# Patient Record
Sex: FEMALE | Race: WHITE | Employment: FULL TIME | URBAN - METROPOLITAN AREA
[De-identification: names, ages, dates, MRNs, and addresses within clinical notes are randomized per-mention and may not be internally consistent; named-entity substitution may affect disease eponyms.]

---

## 2017-06-15 ENCOUNTER — OFFICE VISIT (OUTPATIENT)
Dept: FAMILY MEDICINE CLINIC | Facility: CLINIC | Age: 52
End: 2017-06-15

## 2017-06-15 ENCOUNTER — TELEPHONE (OUTPATIENT)
Dept: FAMILY MEDICINE CLINIC | Facility: CLINIC | Age: 52
End: 2017-06-15

## 2017-06-15 VITALS
HEIGHT: 67.5 IN | SYSTOLIC BLOOD PRESSURE: 122 MMHG | HEART RATE: 78 BPM | TEMPERATURE: 99 F | BODY MASS INDEX: 26.29 KG/M2 | WEIGHT: 169.5 LBS | RESPIRATION RATE: 16 BRPM | DIASTOLIC BLOOD PRESSURE: 82 MMHG

## 2017-06-15 DIAGNOSIS — K21.00 GASTROESOPHAGEAL REFLUX DISEASE WITH ESOPHAGITIS: ICD-10-CM

## 2017-06-15 DIAGNOSIS — Z00.00 HEALTH CARE MAINTENANCE: Primary | ICD-10-CM

## 2017-06-15 DIAGNOSIS — R07.89 ATYPICAL CHEST PAIN: ICD-10-CM

## 2017-06-15 PROBLEM — I05.9 MITRAL VALVE DISORDER: Status: ACTIVE | Noted: 2017-06-15

## 2017-06-15 PROCEDURE — 93000 ELECTROCARDIOGRAM COMPLETE: CPT | Performed by: FAMILY MEDICINE

## 2017-06-15 PROCEDURE — 99396 PREV VISIT EST AGE 40-64: CPT | Performed by: FAMILY MEDICINE

## 2017-06-15 PROCEDURE — 99213 OFFICE O/P EST LOW 20 MIN: CPT | Performed by: FAMILY MEDICINE

## 2017-06-15 RX ORDER — BUDESONIDE AND FORMOTEROL FUMARATE DIHYDRATE 80; 4.5 UG/1; UG/1
2 AEROSOL RESPIRATORY (INHALATION) EVERY MORNING
COMMUNITY
Start: 2013-03-14

## 2017-06-15 RX ORDER — LORATADINE AND PSEUDOEPHEDRINE 10; 240 MG/1; MG/1
1 TABLET, EXTENDED RELEASE ORAL DAILY
Qty: 30 TABLET | Refills: 3 | Status: SHIPPED | OUTPATIENT
Start: 2017-06-15 | End: 2017-06-27

## 2017-06-15 RX ORDER — VENLAFAXINE HYDROCHLORIDE 75 MG/1
1 CAPSULE, EXTENDED RELEASE ORAL DAILY
COMMUNITY
Start: 2013-07-05 | End: 2017-06-23

## 2017-06-15 RX ORDER — LORATADINE 10 MG/1
1 CAPSULE, LIQUID FILLED ORAL DAILY PRN
COMMUNITY
End: 2017-06-27

## 2017-06-15 RX ORDER — ESOMEPRAZOLE MAGNESIUM 40 MG/1
40 CAPSULE, DELAYED RELEASE ORAL
Qty: 30 CAPSULE | Refills: 2 | Status: SHIPPED | OUTPATIENT
Start: 2017-06-15 | End: 2017-09-05

## 2017-06-15 RX ORDER — ALBUTEROL SULFATE 90 UG/1
1 AEROSOL, METERED RESPIRATORY (INHALATION) EVERY 6 HOURS PRN
COMMUNITY
End: 2018-05-14

## 2017-06-15 NOTE — TELEPHONE ENCOUNTER
Lab orders needed for upcoming visit    Future Appointments  Date Time Provider Olinda Andrew   6/23/2017 10:15 AM REF [place] [facility]

## 2017-06-16 NOTE — PROGRESS NOTES
[place] [facility] [place]  PROGRESS NOTE  Chief Complaint:   Patient presents with:  Physical: No pap- sees Dr. Daxa Gonzalez      HPI:   This is a 49year old female coming in for yearly physical.  Over the past 6 months patient's been noticing a significan 0.0 oz/week       0 Standard drinks or equivalent per week    Family History:  Family History   Problem Relation Age of Onset   • Diabetes Father    • Heart Disorder Father    • Hypertension Father    • Lipids Father    • Other[other] [OTHER] Mother    • H aches, back pain, joint pain, swelling or stiffness. NEUROLOGICAL:  Denies headache, seizures, dizziness, syncope, paralysis, , numbness or tingling in the extremities,change in bowel or bladder control. HEMATOLOGIC:  Denies anemia, bleeding or bruising. intact, normal reflexes.     ASSESSMENT AND PLAN:   Geovanna Lucas was seen today for physical.    Diagnoses and all orders for this visit:    Health care maintenance    Atypical chest pain  -     EKG with interpretation and Report -IN OFFICE [[id number]]    Olaf Mckenzie

## 2017-06-22 ENCOUNTER — TELEPHONE (OUTPATIENT)
Dept: FAMILY MEDICINE CLINIC | Facility: CLINIC | Age: 52
End: 2017-06-22

## 2017-06-22 NOTE — TELEPHONE ENCOUNTER
Dr. Yuridia Pakrs, is it OK to print patient's lab orders for her to have done at the hospital?  Nabil Chaparro, 06/22/2017, 4:29 PM

## 2017-06-22 NOTE — TELEPHONE ENCOUNTER
Patient informed of the below. A copy of lab orders left at the  for patient to .  Kate Montenegro, 06/22/2017, 5:29 PM

## 2017-06-23 RX ORDER — VENLAFAXINE HYDROCHLORIDE 75 MG/1
75 CAPSULE, EXTENDED RELEASE ORAL DAILY
Qty: 90 CAPSULE | Refills: 3 | Status: SHIPPED | OUTPATIENT
Start: 2017-06-23 | End: 2018-04-21

## 2017-06-23 NOTE — TELEPHONE ENCOUNTER
Fax received from [facility] requesting a RF of patient's Venlafaxine. Please advise.  Pauline Watson, 06/23/2017, 1:38 PM    Last Labs:  Having ordered labs at the hospital   Last OV:  6/15/2017

## 2017-06-27 ENCOUNTER — OFFICE VISIT (OUTPATIENT)
Dept: FAMILY MEDICINE CLINIC | Facility: CLINIC | Age: 52
End: 2017-06-27

## 2017-06-27 ENCOUNTER — LAB ENCOUNTER (OUTPATIENT)
Dept: LAB | Age: 52
End: 2017-06-27
Attending: NURSE PRACTITIONER
Payer: COMMERCIAL

## 2017-06-27 VITALS
SYSTOLIC BLOOD PRESSURE: 114 MMHG | WEIGHT: 170.63 LBS | TEMPERATURE: 99 F | HEART RATE: 74 BPM | DIASTOLIC BLOOD PRESSURE: 80 MMHG | RESPIRATION RATE: 20 BRPM | BODY MASS INDEX: 26 KG/M2

## 2017-06-27 DIAGNOSIS — N94.89 ADNEXAL MASS: ICD-10-CM

## 2017-06-27 DIAGNOSIS — R10.2 SUPRAPUBIC PAIN: ICD-10-CM

## 2017-06-27 DIAGNOSIS — R10.32 LEFT LOWER QUADRANT PAIN: ICD-10-CM

## 2017-06-27 DIAGNOSIS — R10.32 LEFT LOWER QUADRANT PAIN: Primary | ICD-10-CM

## 2017-06-27 DIAGNOSIS — D25.9 UTERINE LEIOMYOMA, UNSPECIFIED LOCATION: ICD-10-CM

## 2017-06-27 PROCEDURE — 36415 COLL VENOUS BLD VENIPUNCTURE: CPT | Performed by: NURSE PRACTITIONER

## 2017-06-27 PROCEDURE — 99214 OFFICE O/P EST MOD 30 MIN: CPT | Performed by: NURSE PRACTITIONER

## 2017-06-27 PROCEDURE — 80048 BASIC METABOLIC PNL TOTAL CA: CPT | Performed by: NURSE PRACTITIONER

## 2017-06-27 PROCEDURE — 85025 COMPLETE CBC W/AUTO DIFF WBC: CPT | Performed by: NURSE PRACTITIONER

## 2017-06-27 RX ORDER — LORATADINE AND PSEUDOEPHEDRINE 10; 240 MG/1; MG/1
1 TABLET, EXTENDED RELEASE ORAL
Qty: 30 TABLET | Refills: 3 | COMMUNITY
Start: 2017-06-27 | End: 2018-08-24

## 2017-06-27 RX ORDER — FEXOFENADINE HCL 180 MG/1
180 TABLET ORAL DAILY
COMMUNITY
Start: 2015-08-03 | End: 2017-06-27

## 2017-06-27 NOTE — PROGRESS NOTES
[address] [place] [address]  PROGRESS NOTE  Jamila Oleary is a 49year old female.     Chief Complaint:  Patient presents with:  Abdominal Pain: sharp pain on left side     HPI:   Patient presents to office visit with complaint of LLQ abd pain that ra Heart Disorder Father    • Hypertension Father    • Lipids Father    • Other[other] [OTHER] Mother    • Heart Disorder Maternal Grandmother    • Heart Disorder Maternal Grandfather      Allergies:    Iodine (Topical)        Itching  Levofloxacin nonedematous. NECK: supple, no cervical lymphadenopathy  LUNGS: clear to auscultation, no wheezing, rhonchi, crackles. Breathing is nonlabored.   CARDIO: RRR, S1 and S2 heard, without murmur  GI: good BS's,no masses, no HSM, mild suprapubic tenderness, lo worsens. Blood work today: BMP and CBC with differential.  Urinalysis negative. Patient to follow-up with [facility] as they have been managing fibroids in past.  Patient to be seen early this week. ER precautions given. Stay hydrated.

## 2017-06-29 ENCOUNTER — TELEPHONE (OUTPATIENT)
Dept: FAMILY MEDICINE CLINIC | Facility: CLINIC | Age: 52
End: 2017-06-29

## 2017-06-29 NOTE — TELEPHONE ENCOUNTER
----- Message from ISABELA Mahoeny sent at 6/29/2017  8:17 AM CDT -----  BMP within normal limits  CBC within normal limits  Please call patient and see how OB/GYN appointment went and assess how she is feeling.   She has appt with [place] [facility] yes

## 2017-06-29 NOTE — TELEPHONE ENCOUNTER
Let pt know the following below. Pt verbalized her understanding and had no other questions at this time.        Patient states that she saw Dr Jose Betancur yesterday and he did a US and diagnosed her with Adnomyicin- inflamed uterus that is bleeding from the out

## 2017-08-11 ENCOUNTER — TELEPHONE (OUTPATIENT)
Dept: FAMILY MEDICINE CLINIC | Facility: CLINIC | Age: 52
End: 2017-08-11

## 2017-08-11 NOTE — TELEPHONE ENCOUNTER
Lab orders for CMP and CBC were put in on 6/15- additional orders for those tests were put in on 6/27 and completed by pt. Okay to cancel the pending 6/15 order?     Pinky Cyr, 08/11/17, 2:16 PM

## 2017-08-18 ENCOUNTER — TELEPHONE (OUTPATIENT)
Dept: FAMILY MEDICINE CLINIC | Facility: CLINIC | Age: 52
End: 2017-08-18

## 2017-09-05 RX ORDER — ESOMEPRAZOLE MAGNESIUM 40 MG/1
CAPSULE, DELAYED RELEASE ORAL
Qty: 90 CAPSULE | Refills: 1 | Status: SHIPPED | OUTPATIENT
Start: 2017-09-05 | End: 2017-12-28

## 2017-09-05 NOTE — TELEPHONE ENCOUNTER
Last Labs:  6/27/2017  Last OV:  6/15/2017  Last RF:  6/15/2017  No future appointments.   Vickie Adam, 09/05/17, 9:45 AM

## 2017-09-08 ENCOUNTER — TELEPHONE (OUTPATIENT)
Dept: FAMILY MEDICINE CLINIC | Facility: CLINIC | Age: 52
End: 2017-09-08

## 2017-09-08 RX ORDER — AZELASTINE HYDROCHLORIDE 0.5 MG/ML
1 SOLUTION/ DROPS OPHTHALMIC 2 TIMES DAILY
Qty: 1 BOTTLE | Refills: 6 | Status: SHIPPED | OUTPATIENT
Start: 2017-09-08

## 2017-09-08 NOTE — TELEPHONE ENCOUNTER
Medication not on current medication list but is on medication list in [facility]. Optivar 0.05% 1 gtt bilateral bid. Please advise.  Nabil Chaparro, 09/08/17, 9:38 AM    Future appt:  None  Last Appointment:  6/15/2017; No f/u recommendations noted   No

## 2017-09-15 ENCOUNTER — TELEPHONE (OUTPATIENT)
Dept: FAMILY MEDICINE CLINIC | Facility: CLINIC | Age: 52
End: 2017-09-15

## 2017-09-15 LAB
ALBUMIN/GLOBULIN RATIO: 1.8
ALBUMIN: 4.2 G/DL
ALKALINE PHOSPHATASE: 104
ALT: 23
AST: 21
BASOPHIL %: 0.8
BILIRUBIN, TOTAL: 0.4 MG/DL
BUN: 12
CALCIUM: 9.6
CARBON DIOXIDE: 28
CHLORIDE: 104
CHOL/HDLC RATIO: 2.9
CREATININE: 0.82 MG/DL
EGFR IF AFRICN AM: 97
EGFR IF NONAFRICN AM: 83
EOSINOPHIL %: 1.9
GLOBULIN: 2.4
GLUCOSE BLOOD: 87
HCT: 39.5
HDL CHOLESTEROL: 57 MG/DL (ref 60–60)
HGB: 13.2
LDL CHOLESTEROL: 91 MG/DL (ref ?–130)
LYMPHOCYTE %: 23.6
MEAN CELL VOLUME: 90.6
MEAN CORPUSCULAR HEMOGLOBIN: 30.3
MEAN CORPUSCULAR HGB CONC: 33.4
MEAN PLATELET VOLUME: 10.2
MONOCYTE %: 8
NEUTROPHIL %: 65.7
NON HDL CHOL. (LDL+VLDL): 110 MG/DL (ref ?–140)
PLT: 283
POTASSIUM: 4.6
PROTEIN, TOTAL: 6.6
RED BLOOD COUNT: 4.36
RED CELL DISTRIBUTION WIDTH: 11.9
SODIUM: 140
TOTAL CHOLESTEROL: 167 MG/DL (ref ?–200)
TRG: 94 MG/DL (ref ?–150)
TSH: 0.54 UIU/ML
WBC: 4.8

## 2017-09-15 NOTE — TELEPHONE ENCOUNTER
Left message for pt to call back. Have external lab results saying that labs look good, keep up with exercise and balanced diet.  Todd Tay, 09/15/17, 10:39 AM

## 2017-11-27 ENCOUNTER — HOSPITAL ENCOUNTER (OUTPATIENT)
Dept: GENERAL RADIOLOGY | Age: 52
Discharge: HOME | End: 2017-11-27
Attending: NURSE PRACTITIONER
Payer: COMMERCIAL

## 2017-11-27 ENCOUNTER — OFFICE VISIT (OUTPATIENT)
Dept: FAMILY MEDICINE CLINIC | Facility: CLINIC | Age: 52
End: 2017-11-27

## 2017-11-27 VITALS
WEIGHT: 170.81 LBS | SYSTOLIC BLOOD PRESSURE: 110 MMHG | TEMPERATURE: 98 F | BODY MASS INDEX: 26 KG/M2 | OXYGEN SATURATION: 98 % | DIASTOLIC BLOOD PRESSURE: 82 MMHG | HEART RATE: 78 BPM

## 2017-11-27 DIAGNOSIS — R07.89 CHEST PAIN, ATYPICAL: ICD-10-CM

## 2017-11-27 DIAGNOSIS — R06.02 SHORTNESS OF BREATH: ICD-10-CM

## 2017-11-27 DIAGNOSIS — R00.0 EXERCISE-INDUCED TACHYCARDIA: ICD-10-CM

## 2017-11-27 DIAGNOSIS — R07.89 CHEST PAIN, ATYPICAL: Primary | ICD-10-CM

## 2017-11-27 PROCEDURE — 99214 OFFICE O/P EST MOD 30 MIN: CPT | Performed by: NURSE PRACTITIONER

## 2017-11-27 PROCEDURE — 71020 XR CHEST PA + LAT CHEST (CPT=71020): CPT | Performed by: NURSE PRACTITIONER

## 2017-11-27 PROCEDURE — 90686 IIV4 VACC NO PRSV 0.5 ML IM: CPT | Performed by: NURSE PRACTITIONER

## 2017-11-27 PROCEDURE — 90471 IMMUNIZATION ADMIN: CPT | Performed by: NURSE PRACTITIONER

## 2017-11-27 NOTE — PATIENT INSTRUCTIONS
Flu shot today      For your stomach avoid caffeine, alcohol, cigarettes, spicy/acidic foods, and peppermint. Also eat small meals, do not eat before bedtime, also avoid ibuprofen/Aleve/aspirin.     Follow up for a stress test at [address] [place] [address]

## 2017-11-27 NOTE — PROGRESS NOTES
Jamila Oleary is a 47year old female. Patient presents with:  Chest Pain  Eye Problem  Medication Follow-Up      HPI:   Complaints of redness and irritation to left eye lid - upper - now a little  To right eye   Has environmental allergies.    Not use (EFFEXOR XR) 75 MG Oral Capsule SR 24 Hr Take 1 capsule (75 mg total) by mouth daily. Disp: 90 capsule Rfl: 3   Budesonide-Formoterol Fumarate (SYMBICORT) 80-4.5 MCG/ACT Inhalation Aerosol Inhale 2 puffs into the lungs every morning.  Disp:  Rfl:    Albuter auscultation  CARDIO: RRR without murmur, no edema  GI: No epigastric tenderness normal bowel sounds, no masses, no hepatosplenomegaly, or tenderness  MUSCULOSKELETAL: No tenderness to chest wall with palpation.   Normal strength and tone  PSYCH: alert and

## 2017-12-14 ENCOUNTER — TELEPHONE (OUTPATIENT)
Dept: FAMILY MEDICINE CLINIC | Facility: CLINIC | Age: 52
End: 2017-12-14

## 2017-12-14 NOTE — TELEPHONE ENCOUNTER
Patient is calling stating that she just received authorization from the insurance company that she could have the stress test done. Patient is wondering if she could have the test done at [facility] instead of [facility].  Informed patient yes but

## 2017-12-21 ENCOUNTER — TELEPHONE (OUTPATIENT)
Dept: FAMILY MEDICINE CLINIC | Facility: CLINIC | Age: 52
End: 2017-12-21

## 2017-12-21 NOTE — TELEPHONE ENCOUNTER
Mailbox is full unable to leave message.  Will try again later  David Muller, [facility] 12/21/17 2:30 PM

## 2017-12-23 NOTE — TELEPHONE ENCOUNTER
A man called back to the front office and informed them that I had the wrong number and there is no Princess there. As this is the only number in patient's chart, will mail letter.

## 2017-12-28 ENCOUNTER — OFFICE VISIT (OUTPATIENT)
Dept: FAMILY MEDICINE CLINIC | Facility: CLINIC | Age: 52
End: 2017-12-28

## 2017-12-28 VITALS
HEART RATE: 81 BPM | TEMPERATURE: 98 F | DIASTOLIC BLOOD PRESSURE: 80 MMHG | SYSTOLIC BLOOD PRESSURE: 120 MMHG | WEIGHT: 170 LBS | BODY MASS INDEX: 26 KG/M2 | OXYGEN SATURATION: 99 %

## 2017-12-28 DIAGNOSIS — J00 ACUTE NASOPHARYNGITIS: ICD-10-CM

## 2017-12-28 DIAGNOSIS — J02.9 SORE THROAT: Primary | ICD-10-CM

## 2017-12-28 PROCEDURE — 87880 STREP A ASSAY W/OPTIC: CPT | Performed by: NURSE PRACTITIONER

## 2017-12-28 PROCEDURE — 87081 CULTURE SCREEN ONLY: CPT | Performed by: NURSE PRACTITIONER

## 2017-12-28 PROCEDURE — 99213 OFFICE O/P EST LOW 20 MIN: CPT | Performed by: NURSE PRACTITIONER

## 2017-12-28 RX ORDER — INHALER, ASSIST DEVICES
SPACER (EA) MISCELLANEOUS
Qty: 1 EACH | Refills: 0 | Status: SHIPPED | OUTPATIENT
Start: 2017-12-28

## 2017-12-28 RX ORDER — ERGOCALCIFEROL 1.25 MG/1
CAPSULE ORAL
COMMUNITY
End: 2018-05-14 | Stop reason: ALTCHOICE

## 2017-12-28 NOTE — PATIENT INSTRUCTIONS
Rest, increase fluids, salt water gargles ,juan pot (use distilled water) or saline nasal spray, Claritin-D, Tylenol/Ibuprofen, follow up if symptoms persist or increase.

## 2017-12-28 NOTE — PROGRESS NOTES
CHIEF COMPLAINT:   Patient presents with:  Pharyngitis  Nasal Congestion  Runny Nose      HPI:   Page Alejandre is a 47year old female who presents to clinic today with complaints of feeling ill on Tuesday- sore throat- sinus congestion- juan pot - complaints    EXAM:   /80 (BP Location: Right arm, Patient Position: Sitting, Cuff Size: adult)   Pulse 81   Temp 97.7 °F (36.5 °C) (Tympanic)   Wt 170 lb   SpO2 99%   BMI 26.23 kg/m²   GENERAL: well developed, well nourished,in no apparent distress

## 2017-12-30 ENCOUNTER — TELEPHONE (OUTPATIENT)
Dept: FAMILY MEDICINE CLINIC | Facility: CLINIC | Age: 52
End: 2017-12-30

## 2017-12-30 NOTE — TELEPHONE ENCOUNTER
Phoned patient, patient's mailbox is full. Unable to leave a message for patient. Will try back later. Thank you.  VANNESSA MURCIA, 12/30/17, 11:32 AM

## 2017-12-30 NOTE — TELEPHONE ENCOUNTER
----- Message from ISABELA Medina sent at 12/30/2017 10:50 AM CST -----  Negative strep culture- please notify patient

## 2017-12-30 NOTE — TELEPHONE ENCOUNTER
Informed patient of below results. Patient verbalizes understanding and expressed appreciation. Patient states she isn't feeling any better and her symptoms are changing. Offered patient an appointment to be seen on Tuesday.  Patient declined appointment at

## 2018-01-02 ENCOUNTER — TELEPHONE (OUTPATIENT)
Dept: FAMILY MEDICINE CLINIC | Facility: CLINIC | Age: 53
End: 2018-01-02

## 2018-04-21 ENCOUNTER — TELEPHONE (OUTPATIENT)
Dept: FAMILY MEDICINE CLINIC | Facility: CLINIC | Age: 53
End: 2018-04-21

## 2018-04-21 RX ORDER — VENLAFAXINE HYDROCHLORIDE 75 MG/1
75 CAPSULE, EXTENDED RELEASE ORAL DAILY
Qty: 7 CAPSULE | Refills: 0 | Status: SHIPPED | OUTPATIENT
Start: 2018-04-21 | End: 2018-05-14

## 2018-04-21 NOTE — TELEPHONE ENCOUNTER
Requests refill of venlafaxine [facility]  Mail order is arriving on Thursday. She has one tablet left. Would like a weeks worth of medication sent to pharmacy? Last physical and office visit with Dr. Star Chery was 6/15/17. Last lab 9/15/17.

## 2018-04-21 NOTE — TELEPHONE ENCOUNTER
Pt needs refill for Venlafaxine. States that she does not have any more refills and has run out of medication.

## 2018-05-14 PROBLEM — F32.A DEPRESSION: Status: ACTIVE | Noted: 2018-05-14

## 2018-05-14 NOTE — PROGRESS NOTES
[place] [facility] [place]  PROGRESS NOTE  Chief Complaint:   Patient presents with:  Medication Follow-Up: Effexor- doing well      HPI:   This is a 47year old female coming in for refill of her Effexor.   Patient feels that she did very well since History:  Family History   Problem Relation Age of Onset   • Diabetes Father    • Heart Disorder Father    • Hypertension Father    • Lipids Father    • Other[other] [OTHER] Mother    • Heart Disorder Maternal Grandmother    • Heart Disorder Maternal [place] exertion or at rest.  RESPIRATORY:  Denies shortness of breath, wheezing, cough or sputum. GASTROINTESTINAL:  Denies abdominal pain, nausea, vomiting, constipation, diarrhea, or blood in stool.   MUSCULOSKELETAL:  Denies weakness, muscle aches, back pain, cyanosis, no clubbing, FROM, 2+ dorsalis pedis pulses bilaterally. NEURO:  No deficit, normal gait, strength and tone,     ASSESSMENT AND PLAN:   Perfecto Duncan was seen today for medication follow-up.     Diagnoses and all orders for this visit:    Mood swings

## 2018-06-12 RX ORDER — VENLAFAXINE HYDROCHLORIDE 75 MG/1
75 CAPSULE, EXTENDED RELEASE ORAL DAILY
Qty: 90 CAPSULE | Refills: 3 | Status: SHIPPED
Start: 2018-06-12 | End: 2019-03-28

## 2018-06-12 NOTE — TELEPHONE ENCOUNTER
Last RF:  5/14/2018 to [facility], [facility] but patient is requesting mail order. Please advise.

## 2018-06-12 NOTE — TELEPHONE ENCOUNTER
From: Page Alejandre  Sent: 6/12/2018 2:55 PM CDT  Subject: Medication Renewal Request    Page Alejandre would like a refill of the following medications:     Venlafaxine HCl ER (EFFEXOR XR) 75 MG Oral Capsule SR 24 Hr [XAVIER CARRENO MD]   Patient C

## 2018-08-24 RX ORDER — LORATADINE 10 MG
TABLET ORAL
Qty: 30 TABLET | Refills: 3 | Status: SHIPPED | OUTPATIENT
Start: 2018-08-24

## 2018-08-24 NOTE — TELEPHONE ENCOUNTER
Please advise refill of Wal-itin D. Last Rx: 6/27/17     Future appt:    Last Appointment:  5/14/2018 per last office visit notes pt it to f/u in 1 year.     TOTAL CHOLESTEROL (mg/dL)   Date Value   08/02/2017 167   ----------  HDL CHOLESTEROL (mg/dL)   Da

## 2018-08-29 ENCOUNTER — MED REC SCAN ONLY (OUTPATIENT)
Dept: FAMILY MEDICINE CLINIC | Facility: CLINIC | Age: 53
End: 2018-08-29

## 2018-10-15 ENCOUNTER — OFFICE VISIT (OUTPATIENT)
Dept: FAMILY MEDICINE CLINIC | Facility: CLINIC | Age: 53
End: 2018-10-15
Payer: COMMERCIAL

## 2018-10-15 ENCOUNTER — TELEPHONE (OUTPATIENT)
Dept: FAMILY MEDICINE CLINIC | Facility: CLINIC | Age: 53
End: 2018-10-15

## 2018-10-15 VITALS
HEART RATE: 64 BPM | TEMPERATURE: 98 F | HEIGHT: 67 IN | WEIGHT: 161.13 LBS | BODY MASS INDEX: 25.29 KG/M2 | RESPIRATION RATE: 20 BRPM | DIASTOLIC BLOOD PRESSURE: 70 MMHG | SYSTOLIC BLOOD PRESSURE: 106 MMHG

## 2018-10-15 DIAGNOSIS — E55.9 VITAMIN D DEFICIENCY: ICD-10-CM

## 2018-10-15 DIAGNOSIS — R23.2 HOT FLASHES: ICD-10-CM

## 2018-10-15 DIAGNOSIS — Z01.84 IMMUNITY STATUS TESTING: ICD-10-CM

## 2018-10-15 DIAGNOSIS — E04.1 THYROID NODULE: Primary | ICD-10-CM

## 2018-10-15 PROCEDURE — 90471 IMMUNIZATION ADMIN: CPT | Performed by: NURSE PRACTITIONER

## 2018-10-15 PROCEDURE — 84439 ASSAY OF FREE THYROXINE: CPT | Performed by: NURSE PRACTITIONER

## 2018-10-15 PROCEDURE — 84144 ASSAY OF PROGESTERONE: CPT | Performed by: NURSE PRACTITIONER

## 2018-10-15 PROCEDURE — 82670 ASSAY OF TOTAL ESTRADIOL: CPT | Performed by: NURSE PRACTITIONER

## 2018-10-15 PROCEDURE — 80050 GENERAL HEALTH PANEL: CPT | Performed by: NURSE PRACTITIONER

## 2018-10-15 PROCEDURE — 82607 VITAMIN B-12: CPT | Performed by: NURSE PRACTITIONER

## 2018-10-15 PROCEDURE — 82306 VITAMIN D 25 HYDROXY: CPT | Performed by: NURSE PRACTITIONER

## 2018-10-15 PROCEDURE — 90715 TDAP VACCINE 7 YRS/> IM: CPT | Performed by: NURSE PRACTITIONER

## 2018-10-15 PROCEDURE — 86765 RUBEOLA ANTIBODY: CPT | Performed by: NURSE PRACTITIONER

## 2018-10-15 PROCEDURE — 84403 ASSAY OF TOTAL TESTOSTERONE: CPT | Performed by: NURSE PRACTITIONER

## 2018-10-15 PROCEDURE — 83002 ASSAY OF GONADOTROPIN (LH): CPT | Performed by: NURSE PRACTITIONER

## 2018-10-15 PROCEDURE — 82627 DEHYDROEPIANDROSTERONE: CPT | Performed by: NURSE PRACTITIONER

## 2018-10-15 PROCEDURE — 36415 COLL VENOUS BLD VENIPUNCTURE: CPT | Performed by: NURSE PRACTITIONER

## 2018-10-15 PROCEDURE — 83735 ASSAY OF MAGNESIUM: CPT | Performed by: NURSE PRACTITIONER

## 2018-10-15 PROCEDURE — 90686 IIV4 VACC NO PRSV 0.5 ML IM: CPT | Performed by: NURSE PRACTITIONER

## 2018-10-15 PROCEDURE — 86762 RUBELLA ANTIBODY: CPT | Performed by: NURSE PRACTITIONER

## 2018-10-15 PROCEDURE — 84146 ASSAY OF PROLACTIN: CPT | Performed by: NURSE PRACTITIONER

## 2018-10-15 PROCEDURE — 83001 ASSAY OF GONADOTROPIN (FSH): CPT | Performed by: NURSE PRACTITIONER

## 2018-10-15 PROCEDURE — 86735 MUMPS ANTIBODY: CPT | Performed by: NURSE PRACTITIONER

## 2018-10-15 PROCEDURE — 99215 OFFICE O/P EST HI 40 MIN: CPT | Performed by: NURSE PRACTITIONER

## 2018-10-15 PROCEDURE — 90472 IMMUNIZATION ADMIN EACH ADD: CPT | Performed by: NURSE PRACTITIONER

## 2018-10-15 NOTE — PATIENT INSTRUCTIONS
Check MMR titers -    Vit D, thyroid, hormones. Tetanus shot today (you will be due in 10 years, unless you need proof of a 3rd vaccine)    Flu shot today (recommend to have  This every fall)    Find proof of your Prevnar shot for our records.      Veronika Zheng

## 2018-10-15 NOTE — TELEPHONE ENCOUNTER
These call patient and have her schedule a thyroid ultrasound–we had discussed this at the office visit but I did not write it on her visit David Corley has been entered.

## 2018-10-15 NOTE — PROGRESS NOTES
Page Alejandre is a 47year old female. Patient presents with:  Hot Flashes: cold flashes  Imm/Inj      HPI:   Patient is here today with several concerns. States that she needs titers drawn for measles mumps and rubella for [employer] as she is a student. into both eyes 2 (two) times daily. (Patient taking differently: Place 1 drop into both eyes as needed.  ) Disp: 1 Bottle Rfl: 6   Budesonide-Formoterol Fumarate (SYMBICORT) 80-4.5 MCG/ACT Inhalation Aerosol Inhale 2 puffs into the lungs every morning.  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    LUNGS: normal rate without respiratory distress, lungs clear to auscultation  CARDIO: RRR without murmur, no edema  GI: normal bowel sounds, no masses, no hepatosplenomegaly, or te colonoscopy - Dr. Codey Armendariz -     Schedule mammogram - here at [facility] [place]-     Follow up for pap with Dr. Meg Holman - will check hormone blood work today- will fax results to Dr. Meg Holman.     -Also had discussed thyroid ultrasound with patient–was not writte

## 2018-10-16 NOTE — TELEPHONE ENCOUNTER
Patient notified. She is not sure where she will have Ultrasound done yet.  She will call back and let us know and advise us if we need to send order to hospital.

## 2018-10-22 ENCOUNTER — OFFICE VISIT (OUTPATIENT)
Dept: FAMILY MEDICINE CLINIC | Facility: CLINIC | Age: 53
End: 2018-10-22
Payer: COMMERCIAL

## 2018-10-22 VITALS
SYSTOLIC BLOOD PRESSURE: 100 MMHG | OXYGEN SATURATION: 98 % | WEIGHT: 161 LBS | DIASTOLIC BLOOD PRESSURE: 76 MMHG | BODY MASS INDEX: 25.27 KG/M2 | HEIGHT: 67 IN | HEART RATE: 61 BPM | TEMPERATURE: 97 F

## 2018-10-22 DIAGNOSIS — K52.9 GASTROENTERITIS: Primary | ICD-10-CM

## 2018-10-22 PROCEDURE — 99213 OFFICE O/P EST LOW 20 MIN: CPT | Performed by: NURSE PRACTITIONER

## 2018-10-22 NOTE — PROGRESS NOTES
Anabel Salas is a 47year old female.   Patient presents with:  Nausea: started feeling bad yesterday  Headache  Diarrhea    HPI:   Complaints of getting up this morning(10/22), was nauseous in waves, had diarrhea, is hot/cold, feels unsteady when gett Packs/day: 1.00        Years: 12.00        Pack years: 12      Smokeless tobacco: Never Used    Alcohol use:  Yes      Alcohol/week: 0.0 oz      Comment: 0-1 per month- mixed    Drug use: No    Family History   Problem Relation Age of Onset   • Diabetes Fat diet, avoid dairy and acidic, spicy, and fatty foods. When ready for food try BRAT diet (bananas, rice, applesauce, tea/toast) and crackers.  Follow up if symptoms persist or increase (fever, blood in your stool, dark black/sticky stools, or severe abdomin

## 2019-03-28 RX ORDER — VENLAFAXINE HYDROCHLORIDE 75 MG/1
75 CAPSULE, EXTENDED RELEASE ORAL DAILY
Qty: 90 CAPSULE | Refills: 0 | Status: SHIPPED | OUTPATIENT
Start: 2019-03-28

## 2019-03-28 NOTE — TELEPHONE ENCOUNTER
Future appt:     Last Appointment:  5/14/2018;    Return in about 1 year (around 5/14/2019).      Total Cholesterol (mg/dL)   Date Value   08/02/2017 167     HDL Cholesterol (mg/dL)   Date Value   08/02/2017 57     LDL Cholesterol (mg/dL)   Date Value   08/

## 2019-04-15 ENCOUNTER — TELEPHONE (OUTPATIENT)
Dept: FAMILY MEDICINE CLINIC | Facility: CLINIC | Age: 54
End: 2019-04-15

## 2019-04-15 NOTE — TELEPHONE ENCOUNTER
Please enter order for Tdap pt needs for school. I explained to pt she just received a Tdap in October of 2018. According Frank Hayden pt needs to have 3 recorded Tdap and pt only has 2. Pt needs another Tdap or she will be suspended from school.      Kandy

## 2019-04-15 NOTE — TELEPHONE ENCOUNTER
Order placed  Check [facility] or possibly paper chart  to see if further TDAP  listed there so pt does not need to get shot.

## 2019-04-17 ENCOUNTER — NURSE ONLY (OUTPATIENT)
Dept: FAMILY MEDICINE CLINIC | Facility: CLINIC | Age: 54
End: 2019-04-17
Payer: COMMERCIAL

## 2019-04-17 DIAGNOSIS — Z23 NEED FOR TDAP VACCINATION: ICD-10-CM

## 2019-04-17 PROCEDURE — 90471 IMMUNIZATION ADMIN: CPT | Performed by: FAMILY MEDICINE

## 2019-04-17 PROCEDURE — 90715 TDAP VACCINE 7 YRS/> IM: CPT | Performed by: FAMILY MEDICINE

## 2019-04-17 NOTE — PROGRESS NOTES
Patient here for third Tdap vaccine as ordered by Dr. Aranza Perez. 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 See telephone encounter 4/15/19    Patient denies any previous vaccine allergies or reactions.    Denies chemotherapy or steroid use

## 2019-05-01 ENCOUNTER — OFFICE VISIT (OUTPATIENT)
Dept: FAMILY MEDICINE CLINIC | Facility: CLINIC | Age: 54
End: 2019-05-01
Payer: COMMERCIAL

## 2019-05-01 ENCOUNTER — TELEPHONE (OUTPATIENT)
Dept: FAMILY MEDICINE CLINIC | Facility: CLINIC | Age: 54
End: 2019-05-01

## 2019-05-01 VITALS
HEIGHT: 67.5 IN | RESPIRATION RATE: 18 BRPM | WEIGHT: 172.19 LBS | OXYGEN SATURATION: 98 % | DIASTOLIC BLOOD PRESSURE: 74 MMHG | BODY MASS INDEX: 26.71 KG/M2 | TEMPERATURE: 98 F | HEART RATE: 78 BPM | SYSTOLIC BLOOD PRESSURE: 112 MMHG

## 2019-05-01 DIAGNOSIS — J98.01 BRONCHOSPASM: ICD-10-CM

## 2019-05-01 DIAGNOSIS — K21.9 GASTROESOPHAGEAL REFLUX DISEASE, ESOPHAGITIS PRESENCE NOT SPECIFIED: Primary | ICD-10-CM

## 2019-05-01 PROCEDURE — 99214 OFFICE O/P EST MOD 30 MIN: CPT | Performed by: NURSE PRACTITIONER

## 2019-05-01 RX ORDER — ESOMEPRAZOLE MAGNESIUM 40 MG/1
40 CAPSULE, DELAYED RELEASE ORAL
Qty: 30 CAPSULE | Refills: 2 | Status: SHIPPED | OUTPATIENT
Start: 2019-05-01 | End: 2019-08-03

## 2019-05-01 NOTE — PATIENT INSTRUCTIONS
For your stomach avoid caffeine, alcohol, tomato sauce,  cigarettes, spicy/acidic foods, and peppermint. Also eat small meals, do not eat before bedtime, also avoid ibuprofen/Aleve/aspirin.      Restart nexium - daily-  First thing in the morning- on an em

## 2019-05-01 NOTE — TELEPHONE ENCOUNTER
Pt states her wheezing and GERD is getting worse especially when exercising. Schedule appt with Lowry Bamberger today.     Future Appointments   Date Time Provider Olinda Andrew   5/1/2019  1:45 PM JOEL Sidhu [facility] [place]

## 2019-05-01 NOTE — PROGRESS NOTES
CHIEF COMPLAINT:   Patient presents with:  Reflux  Wheezing      HPI:   Sosa Glez is a 47year old female who presents to clinic today with complaints of GERD - has gotten worse in the last month- will vomit if she bends over   Breathing has diana [facility]) 2011      Social History:  Social History    Tobacco Use      Smoking status: Former Smoker        Packs/day: 1.00        Years: 12.00        Pack years: 12      Smokeless tobacco: Never Used    Alcohol use:  Yes      Alcohol/week: 0.0 o bedtime, also avoid ibuprofen/Aleve/aspirin. Restart nexium - daily-  First thing in the morning- on an empty stomach - take for 6-8 weeks daily -then as needed. Patient voiced understanding and is in agreement with treatment plan.   Follow up if

## 2019-05-30 ENCOUNTER — OFFICE VISIT (OUTPATIENT)
Dept: FAMILY MEDICINE CLINIC | Facility: CLINIC | Age: 54
End: 2019-05-30
Payer: COMMERCIAL

## 2019-05-30 VITALS
HEIGHT: 67.5 IN | TEMPERATURE: 98 F | DIASTOLIC BLOOD PRESSURE: 80 MMHG | BODY MASS INDEX: 26.52 KG/M2 | SYSTOLIC BLOOD PRESSURE: 112 MMHG | WEIGHT: 171 LBS | HEART RATE: 77 BPM | OXYGEN SATURATION: 96 %

## 2019-05-30 DIAGNOSIS — J40 BRONCHITIS: Primary | ICD-10-CM

## 2019-05-30 DIAGNOSIS — J02.9 SORE THROAT: ICD-10-CM

## 2019-05-30 PROCEDURE — 99214 OFFICE O/P EST MOD 30 MIN: CPT | Performed by: NURSE PRACTITIONER

## 2019-05-30 PROCEDURE — 87081 CULTURE SCREEN ONLY: CPT | Performed by: NURSE PRACTITIONER

## 2019-05-30 PROCEDURE — 87880 STREP A ASSAY W/OPTIC: CPT | Performed by: NURSE PRACTITIONER

## 2019-05-30 RX ORDER — AZITHROMYCIN 250 MG/1
TABLET, FILM COATED ORAL
Qty: 6 TABLET | Refills: 0 | Status: SHIPPED | OUTPATIENT
Start: 2019-05-30 | End: 2019-06-21 | Stop reason: ALTCHOICE

## 2019-05-30 NOTE — PATIENT INSTRUCTIONS
Rest, increase fluids, salt water gargles ,juan pot (use distilled water) or saline nasal spray, Mucinex-DM, Tylenol/Ibuprofen, follow up if symptoms persist or increase.       Use proair inhaler prior to your Symbicort-  Continue Symbicort 2 puffs twice

## 2019-05-30 NOTE — PROGRESS NOTES
CHIEF COMPLAINT:   Patient presents with:  Sore Throat  Cough  Runny Nose      HPI:   Jamila Oleary is a 47year old female who presents to clinic today with complaints of sore throat Friday- woke up with it - also had runny nose- all week- now is co Tobacco Use      Smoking status: Former Smoker        Packs/day: 1.00        Years: 12.00        Pack years: 12      Smokeless tobacco: Never Used    Alcohol use:  Yes      Alcohol/week: 0.0 oz      Comment: 0-1 per month- mixed    Drug use: No       REVIEW over the weekend. Patient voiced understanding and is in agreement with treatment plan. Follow up if symptoms do not improve or if symptoms worsen. Risk and benefits of medication discussed.        Meds & Refills for this Visit:  Requested [address] [facility]

## 2019-06-01 ENCOUNTER — TELEPHONE (OUTPATIENT)
Dept: FAMILY MEDICINE CLINIC | Facility: CLINIC | Age: 54
End: 2019-06-01

## 2019-06-01 RX ORDER — ALBUTEROL SULFATE 90 UG/1
1 AEROSOL, METERED RESPIRATORY (INHALATION) EVERY 6 HOURS PRN
Qty: 1 INHALER | Refills: 1 | Status: SHIPPED | OUTPATIENT
Start: 2019-06-01 | End: 2019-07-16

## 2019-06-21 ENCOUNTER — OFFICE VISIT (OUTPATIENT)
Dept: FAMILY MEDICINE CLINIC | Facility: CLINIC | Age: 54
End: 2019-06-21
Payer: COMMERCIAL

## 2019-06-21 VITALS
HEART RATE: 76 BPM | BODY MASS INDEX: 26.84 KG/M2 | RESPIRATION RATE: 16 BRPM | SYSTOLIC BLOOD PRESSURE: 126 MMHG | TEMPERATURE: 98 F | WEIGHT: 173 LBS | HEIGHT: 67.5 IN | DIASTOLIC BLOOD PRESSURE: 80 MMHG | OXYGEN SATURATION: 98 %

## 2019-06-21 DIAGNOSIS — K64.8 HEMORRHOIDS, INTERNAL, WITH BLEEDING: Primary | ICD-10-CM

## 2019-06-21 PROCEDURE — 82272 OCCULT BLD FECES 1-3 TESTS: CPT | Performed by: NURSE PRACTITIONER

## 2019-06-21 PROCEDURE — 99214 OFFICE O/P EST MOD 30 MIN: CPT | Performed by: NURSE PRACTITIONER

## 2019-06-21 NOTE — PATIENT INSTRUCTIONS
Rectal hydrocortisone cream 1 application twice a day for five days. Increase your oral fluids, water. Increase your fiber intake. If your symptoms do not improve over the next 5-7 days, notify the clinic and we will refer back to Dr. Judith Painting   Hemorrh to pass. ? Increase the fiber in your diet with more fiber-rich foods. These include fresh fruit, vegetables, and whole grains. ? Take a fiber supplement or bulking agent, if advised by your healthcare provider.  These include products such as psyllium or

## 2019-06-21 NOTE — PROGRESS NOTES
[facility] [place]  PROGRESS NOTE  Chief Complaint:   Patient presents with:  Diarrhea  Other: lots of clotted blood in toilet      HPI:   This is a 47year old female coming in for blood in stool.     Had episode of diarrhea and nausea Tuesday n Current Meds:    Current Outpatient Medications:  hydrocortisone (PROCTOSOL HC) 2.5 % Rectal Cream Place 1 Application rectally 2 (two) times daily for 5 days.  Disp: 1 Tube Rfl: 0   Albuterol Sulfate HFA (PROAIR HFA) 108 (90 Base) MCG/ACT Inhalation Aero S NEUROLOGICAL:  Denies headache, seizures, dizziness, syncope, paralysis, ataxia, numbness or tingling in the extremities,change in bowel or bladder control. HEMATOLOGIC:  Denies anemia,  or bruising. Bleeding noted from rectum.    LYMPHATICS:  Denies enla Rectal: No external hemorrhoids nor fissures noted, no palpable hemorrhoids with digital exam, In office hemoccult negative; Anaoscope with internal hemorrhoids noted, no active bleeding.     EXTREMITIES:  No edema, no cyanosis, no clubbing, Full ROM, 2+ do The veins can become swollen due to increased pressure in them.  This is most often caused by:  · Chronic constipation or diarrhea  · Straining when having a bowel movement  · Sitting too long on the toilet  · A low-fiber diet  · Pregnancy  Symptoms  · Blee · Be more active. Frequent exercise aids digestion and helps prevent constipation. It may also help make bowel movements more regular. · Don’t strain during bowel movements. This can make hemorrhoids more likely.  Also, don’t sit on the toilet for long per

## 2019-07-16 NOTE — TELEPHONE ENCOUNTER
No future appointments. Last visit: 5/30/19  Last refill: 6/1/19  Follow up:   Instructions     Rest, increase fluids, salt water gargles ,juan pot (use distilled water) or saline nasal spray, Mucinex-DM, Tylenol/Ibuprofen, follow up if symptoms persist

## 2019-07-22 LAB
OCCULT BLOOD, STOOL 1: NEGATIVE
PERFORMANCE MONITORS CORRECT (YES/NO): YES YES/NO
TEST CARD LOT #: 171 NUMERIC

## 2019-08-03 NOTE — TELEPHONE ENCOUNTER
Future appt:    Last Appointment:  Visit date not found  Total Cholesterol (mg/dL)   Date Value   08/02/2017 167     HDL Cholesterol (mg/dL)   Date Value   08/02/2017 57     LDL Cholesterol (mg/dL)   Date Value   08/02/2017 91     TRG (mg/dL)   Date Value

## 2019-08-05 RX ORDER — ESOMEPRAZOLE MAGNESIUM 40 MG/1
40 CAPSULE, DELAYED RELEASE ORAL
Qty: 90 CAPSULE | Refills: 1 | Status: SHIPPED | OUTPATIENT
Start: 2019-08-05

## 2019-08-05 NOTE — TELEPHONE ENCOUNTER
Patient is due for a physical–since she was a Dr. Nabil Rolon patient she should establish with a new MD please have her schedule appointment–refills given. Although, I had only wanted patient to stay on this medication from 6 to 8 weeks.   If she needs to c

## 2019-08-10 NOTE — TELEPHONE ENCOUNTER
Informed pt she does need a px. Pt states her insurance only pays for one px per year and see saw Dr. Maharaj Fitting for px 3 months ago.     Pt will call back to schedule appt with a new

## 2021-07-31 NOTE — PATIENT INSTRUCTIONS
Blood work today. Urine sample today. Urine dip negative. Follow up with OB/GYN this week. Take tylenol or ibuprofen otc as needed for discomfort. Go to ER if pain worsens. show

## (undated) NOTE — LETTER
[address], [facility]  [address] [phone]            August 18, 2017      [address] [facility]      Dear Ms. Trevin Gonzalez,     It has come to my attentio

## (undated) NOTE — LETTER
[address], [address] [facility]            December 23, 2017      [address] [facility] [employer]      Dear Alexander Jhaveri:    Our office has been trying to contact

## (undated) NOTE — MR AVS SNAPSHOT
[address] [id number]  [phone]               Thank you for choosing us for your health care visit with Jose Montenegro MD.  We are glad to serve you and happy to provide you with this summary of yo Take 1 capsule by mouth daily.            Esomeprazole Magnesium 40 MG Cpdr   Take 1 capsule (40 mg total) by mouth every morning before breakfast.   Commonly known as:  NEXIUM           Loratadine-Pseudoephedrine ER  MG Tb24   Take 1 tablet by mouth Fully enjoy your food when eating. Don’t eat while distracted and slow down. Avoid over sized portions. Don’t eat while when you’re bored.      EAT THESE FOODS MORE OFTEN: EAT THESE FOODS LESS OFTEN:   Make half your plate fruits and vegetables Highly

## (undated) NOTE — LETTER
[address], [facility]  [address] [facility]            August 18, 2017      [address] [facility]      Dear Ms. Garcia Stagers,     It has come to my attentio

## (undated) NOTE — LETTER
11/14/18        [address] [facility]      Dear Db Bro,    [facility] records indicate that you have outstanding lab work and or testing that was ordered for you and has not yet been completed:  Orders Placed This Encounter

## (undated) NOTE — LETTER
Date: 10/22/2018    Patient Name: Mario Aldana          To Whom it may concern: This letter has been written at the patient's request. The above patient was seen at the [facility] for treatment of a medical condition.     This patient